# Patient Record
Sex: FEMALE | Race: WHITE | NOT HISPANIC OR LATINO | URBAN - METROPOLITAN AREA
[De-identification: names, ages, dates, MRNs, and addresses within clinical notes are randomized per-mention and may not be internally consistent; named-entity substitution may affect disease eponyms.]

---

## 2023-07-09 ENCOUNTER — INPATIENT (INPATIENT)
Facility: HOSPITAL | Age: 23
LOS: 0 days | Discharge: HOME | End: 2023-07-10
Attending: OBSTETRICS & GYNECOLOGY | Admitting: OBSTETRICS & GYNECOLOGY
Payer: COMMERCIAL

## 2023-07-09 VITALS
HEIGHT: 60 IN | DIASTOLIC BLOOD PRESSURE: 67 MMHG | TEMPERATURE: 99 F | HEART RATE: 89 BPM | RESPIRATION RATE: 16 BRPM | WEIGHT: 110.01 LBS | SYSTOLIC BLOOD PRESSURE: 119 MMHG

## 2023-07-09 DIAGNOSIS — O26.893 OTHER SPECIFIED PREGNANCY RELATED CONDITIONS, THIRD TRIMESTER: ICD-10-CM

## 2023-07-09 LAB
ABO RH CONFIRMATION: SIGNIFICANT CHANGE UP
APPEARANCE UR: CLEAR — SIGNIFICANT CHANGE UP
BASOPHILS # BLD AUTO: 0.04 K/UL — SIGNIFICANT CHANGE UP (ref 0–0.2)
BASOPHILS # BLD AUTO: 0.06 K/UL — SIGNIFICANT CHANGE UP (ref 0–0.2)
BASOPHILS NFR BLD AUTO: 0.4 % — SIGNIFICANT CHANGE UP (ref 0–1)
BASOPHILS NFR BLD AUTO: 0.4 % — SIGNIFICANT CHANGE UP (ref 0–1)
BILIRUB UR-MCNC: NEGATIVE — SIGNIFICANT CHANGE UP
COLOR SPEC: SIGNIFICANT CHANGE UP
DIFF PNL FLD: NEGATIVE — SIGNIFICANT CHANGE UP
EOSINOPHIL # BLD AUTO: 0.04 K/UL — SIGNIFICANT CHANGE UP (ref 0–0.7)
EOSINOPHIL # BLD AUTO: 0.11 K/UL — SIGNIFICANT CHANGE UP (ref 0–0.7)
EOSINOPHIL NFR BLD AUTO: 0.4 % — SIGNIFICANT CHANGE UP (ref 0–8)
EOSINOPHIL NFR BLD AUTO: 0.8 % — SIGNIFICANT CHANGE UP (ref 0–8)
GLUCOSE UR QL: NEGATIVE — SIGNIFICANT CHANGE UP
HCT VFR BLD CALC: 32.5 % — LOW (ref 37–47)
HCT VFR BLD CALC: 34.9 % — LOW (ref 37–47)
HGB BLD-MCNC: 10.7 G/DL — LOW (ref 12–16)
HGB BLD-MCNC: 11.7 G/DL — LOW (ref 12–16)
HIV 1 & 2 AB SERPL IA.RAPID: SIGNIFICANT CHANGE UP
IMM GRANULOCYTES NFR BLD AUTO: 1.3 % — HIGH (ref 0.1–0.3)
IMM GRANULOCYTES NFR BLD AUTO: 1.5 % — HIGH (ref 0.1–0.3)
KETONES UR-MCNC: ABNORMAL
LEUKOCYTE ESTERASE UR-ACNC: NEGATIVE — SIGNIFICANT CHANGE UP
LYMPHOCYTES # BLD AUTO: 16.8 % — LOW (ref 20.5–51.1)
LYMPHOCYTES # BLD AUTO: 2.32 K/UL — SIGNIFICANT CHANGE UP (ref 1.2–3.4)
LYMPHOCYTES # BLD AUTO: 2.55 K/UL — SIGNIFICANT CHANGE UP (ref 1.2–3.4)
LYMPHOCYTES # BLD AUTO: 23.7 % — SIGNIFICANT CHANGE UP (ref 20.5–51.1)
MCHC RBC-ENTMCNC: 27.2 PG — SIGNIFICANT CHANGE UP (ref 27–31)
MCHC RBC-ENTMCNC: 27.5 PG — SIGNIFICANT CHANGE UP (ref 27–31)
MCHC RBC-ENTMCNC: 32.9 G/DL — SIGNIFICANT CHANGE UP (ref 32–37)
MCHC RBC-ENTMCNC: 33.5 G/DL — SIGNIFICANT CHANGE UP (ref 32–37)
MCV RBC AUTO: 81.9 FL — SIGNIFICANT CHANGE UP (ref 81–99)
MCV RBC AUTO: 82.7 FL — SIGNIFICANT CHANGE UP (ref 81–99)
MONOCYTES # BLD AUTO: 0.74 K/UL — HIGH (ref 0.1–0.6)
MONOCYTES # BLD AUTO: 0.94 K/UL — HIGH (ref 0.1–0.6)
MONOCYTES NFR BLD AUTO: 6.8 % — SIGNIFICANT CHANGE UP (ref 1.7–9.3)
MONOCYTES NFR BLD AUTO: 6.9 % — SIGNIFICANT CHANGE UP (ref 1.7–9.3)
NEUTROPHILS # BLD AUTO: 10.24 K/UL — HIGH (ref 1.4–6.5)
NEUTROPHILS # BLD AUTO: 7.23 K/UL — HIGH (ref 1.4–6.5)
NEUTROPHILS NFR BLD AUTO: 67.1 % — SIGNIFICANT CHANGE UP (ref 42.2–75.2)
NEUTROPHILS NFR BLD AUTO: 73.9 % — SIGNIFICANT CHANGE UP (ref 42.2–75.2)
NITRITE UR-MCNC: NEGATIVE — SIGNIFICANT CHANGE UP
NRBC # BLD: 0 /100 WBCS — SIGNIFICANT CHANGE UP (ref 0–0)
NRBC # BLD: 0 /100 WBCS — SIGNIFICANT CHANGE UP (ref 0–0)
PH UR: 6.5 — SIGNIFICANT CHANGE UP (ref 5–8)
PLATELET # BLD AUTO: 228 K/UL — SIGNIFICANT CHANGE UP (ref 130–400)
PLATELET # BLD AUTO: 245 K/UL — SIGNIFICANT CHANGE UP (ref 130–400)
PMV BLD: 10.6 FL — HIGH (ref 7.4–10.4)
PMV BLD: 10.9 FL — HIGH (ref 7.4–10.4)
PRENATAL SYPHILIS TEST: SIGNIFICANT CHANGE UP
PROT UR-MCNC: SIGNIFICANT CHANGE UP
RBC # BLD: 3.93 M/UL — LOW (ref 4.2–5.4)
RBC # BLD: 4.26 M/UL — SIGNIFICANT CHANGE UP (ref 4.2–5.4)
RBC # FLD: 15.9 % — HIGH (ref 11.5–14.5)
RBC # FLD: 16 % — HIGH (ref 11.5–14.5)
SP GR SPEC: 1.02 — SIGNIFICANT CHANGE UP (ref 1.01–1.03)
UROBILINOGEN FLD QL: SIGNIFICANT CHANGE UP
WBC # BLD: 10.76 K/UL — SIGNIFICANT CHANGE UP (ref 4.8–10.8)
WBC # BLD: 13.85 K/UL — HIGH (ref 4.8–10.8)
WBC # FLD AUTO: 10.76 K/UL — SIGNIFICANT CHANGE UP (ref 4.8–10.8)
WBC # FLD AUTO: 13.85 K/UL — HIGH (ref 4.8–10.8)

## 2023-07-09 PROCEDURE — 86850 RBC ANTIBODY SCREEN: CPT

## 2023-07-09 PROCEDURE — 85025 COMPLETE CBC W/AUTO DIFF WBC: CPT

## 2023-07-09 PROCEDURE — 86901 BLOOD TYPING SEROLOGIC RH(D): CPT

## 2023-07-09 PROCEDURE — 80354 DRUG SCREENING FENTANYL: CPT

## 2023-07-09 PROCEDURE — 87340 HEPATITIS B SURFACE AG IA: CPT

## 2023-07-09 PROCEDURE — 36415 COLL VENOUS BLD VENIPUNCTURE: CPT

## 2023-07-09 PROCEDURE — 86762 RUBELLA ANTIBODY: CPT

## 2023-07-09 PROCEDURE — 80307 DRUG TEST PRSMV CHEM ANLYZR: CPT

## 2023-07-09 PROCEDURE — 86900 BLOOD TYPING SEROLOGIC ABO: CPT

## 2023-07-09 PROCEDURE — 86735 MUMPS ANTIBODY: CPT

## 2023-07-09 PROCEDURE — 86703 HIV-1/HIV-2 1 RESULT ANTBDY: CPT

## 2023-07-09 PROCEDURE — 59050 FETAL MONITOR W/REPORT: CPT

## 2023-07-09 PROCEDURE — 81003 URINALYSIS AUTO W/O SCOPE: CPT

## 2023-07-09 PROCEDURE — 86592 SYPHILIS TEST NON-TREP QUAL: CPT

## 2023-07-09 PROCEDURE — 86765 RUBEOLA ANTIBODY: CPT

## 2023-07-09 PROCEDURE — 86787 VARICELLA-ZOSTER ANTIBODY: CPT

## 2023-07-09 RX ORDER — FAMOTIDINE 10 MG/ML
20 INJECTION INTRAVENOUS ONCE
Refills: 0 | Status: COMPLETED | OUTPATIENT
Start: 2023-07-09 | End: 2023-07-09

## 2023-07-09 RX ORDER — KETOROLAC TROMETHAMINE 30 MG/ML
30 SYRINGE (ML) INJECTION ONCE
Refills: 0 | Status: DISCONTINUED | OUTPATIENT
Start: 2023-07-09 | End: 2023-07-09

## 2023-07-09 RX ORDER — MAGNESIUM HYDROXIDE 400 MG/1
30 TABLET, CHEWABLE ORAL
Refills: 0 | Status: DISCONTINUED | OUTPATIENT
Start: 2023-07-09 | End: 2023-07-10

## 2023-07-09 RX ORDER — OXYTOCIN 10 UNIT/ML
41.67 VIAL (ML) INJECTION
Qty: 20 | Refills: 0 | Status: DISCONTINUED | OUTPATIENT
Start: 2023-07-09 | End: 2023-07-10

## 2023-07-09 RX ORDER — LANOLIN
1 OINTMENT (GRAM) TOPICAL EVERY 6 HOURS
Refills: 0 | Status: DISCONTINUED | OUTPATIENT
Start: 2023-07-09 | End: 2023-07-10

## 2023-07-09 RX ORDER — OXYTOCIN 10 UNIT/ML
333.33 VIAL (ML) INJECTION
Qty: 20 | Refills: 0 | Status: DISCONTINUED | OUTPATIENT
Start: 2023-07-09 | End: 2023-07-09

## 2023-07-09 RX ORDER — DIBUCAINE 1 %
1 OINTMENT (GRAM) RECTAL EVERY 6 HOURS
Refills: 0 | Status: DISCONTINUED | OUTPATIENT
Start: 2023-07-09 | End: 2023-07-10

## 2023-07-09 RX ORDER — AMPICILLIN TRIHYDRATE 250 MG
1 CAPSULE ORAL EVERY 4 HOURS
Refills: 0 | Status: DISCONTINUED | OUTPATIENT
Start: 2023-07-09 | End: 2023-07-09

## 2023-07-09 RX ORDER — FENTANYL/BUPIVACAINE/NS/PF 2MCG/ML-.1
250 PLASTIC BAG, INJECTION (ML) INJECTION
Refills: 0 | Status: DISCONTINUED | OUTPATIENT
Start: 2023-07-09 | End: 2023-07-09

## 2023-07-09 RX ORDER — SIMETHICONE 80 MG/1
80 TABLET, CHEWABLE ORAL EVERY 4 HOURS
Refills: 0 | Status: DISCONTINUED | OUTPATIENT
Start: 2023-07-09 | End: 2023-07-10

## 2023-07-09 RX ORDER — NALOXONE HYDROCHLORIDE 4 MG/.1ML
0.1 SPRAY NASAL
Refills: 0 | Status: DISCONTINUED | OUTPATIENT
Start: 2023-07-09 | End: 2023-07-09

## 2023-07-09 RX ORDER — PRAMOXINE HYDROCHLORIDE 150 MG/15G
1 AEROSOL, FOAM RECTAL EVERY 4 HOURS
Refills: 0 | Status: DISCONTINUED | OUTPATIENT
Start: 2023-07-09 | End: 2023-07-10

## 2023-07-09 RX ORDER — IBUPROFEN 200 MG
600 TABLET ORAL EVERY 6 HOURS
Refills: 0 | Status: DISCONTINUED | OUTPATIENT
Start: 2023-07-09 | End: 2023-07-10

## 2023-07-09 RX ORDER — ONDANSETRON 8 MG/1
4 TABLET, FILM COATED ORAL EVERY 6 HOURS
Refills: 0 | Status: DISCONTINUED | OUTPATIENT
Start: 2023-07-09 | End: 2023-07-09

## 2023-07-09 RX ORDER — AER TRAVELER 0.5 G/1
1 SOLUTION RECTAL; TOPICAL EVERY 4 HOURS
Refills: 0 | Status: DISCONTINUED | OUTPATIENT
Start: 2023-07-09 | End: 2023-07-10

## 2023-07-09 RX ORDER — TETANUS TOXOID, REDUCED DIPHTHERIA TOXOID AND ACELLULAR PERTUSSIS VACCINE, ADSORBED 5; 2.5; 8; 8; 2.5 [IU]/.5ML; [IU]/.5ML; UG/.5ML; UG/.5ML; UG/.5ML
0.5 SUSPENSION INTRAMUSCULAR ONCE
Refills: 0 | Status: DISCONTINUED | OUTPATIENT
Start: 2023-07-09 | End: 2023-07-10

## 2023-07-09 RX ORDER — DIPHENHYDRAMINE HCL 50 MG
25 CAPSULE ORAL EVERY 6 HOURS
Refills: 0 | Status: DISCONTINUED | OUTPATIENT
Start: 2023-07-09 | End: 2023-07-10

## 2023-07-09 RX ORDER — HYDROCORTISONE 1 %
1 OINTMENT (GRAM) TOPICAL EVERY 6 HOURS
Refills: 0 | Status: DISCONTINUED | OUTPATIENT
Start: 2023-07-09 | End: 2023-07-10

## 2023-07-09 RX ORDER — OXYCODONE HYDROCHLORIDE 5 MG/1
5 TABLET ORAL
Refills: 0 | Status: DISCONTINUED | OUTPATIENT
Start: 2023-07-09 | End: 2023-07-10

## 2023-07-09 RX ORDER — CITRIC ACID/SODIUM CITRATE 300-500 MG
15 SOLUTION, ORAL ORAL EVERY 6 HOURS
Refills: 0 | Status: DISCONTINUED | OUTPATIENT
Start: 2023-07-09 | End: 2023-07-09

## 2023-07-09 RX ORDER — SODIUM CHLORIDE 9 MG/ML
3 INJECTION INTRAMUSCULAR; INTRAVENOUS; SUBCUTANEOUS EVERY 8 HOURS
Refills: 0 | Status: DISCONTINUED | OUTPATIENT
Start: 2023-07-09 | End: 2023-07-10

## 2023-07-09 RX ORDER — SODIUM CHLORIDE 9 MG/ML
1000 INJECTION, SOLUTION INTRAVENOUS
Refills: 0 | Status: DISCONTINUED | OUTPATIENT
Start: 2023-07-09 | End: 2023-07-09

## 2023-07-09 RX ORDER — IBUPROFEN 200 MG
600 TABLET ORAL EVERY 6 HOURS
Refills: 0 | Status: COMPLETED | OUTPATIENT
Start: 2023-07-09 | End: 2024-06-06

## 2023-07-09 RX ORDER — AMPICILLIN TRIHYDRATE 250 MG
2 CAPSULE ORAL ONCE
Refills: 0 | Status: COMPLETED | OUTPATIENT
Start: 2023-07-09 | End: 2023-07-09

## 2023-07-09 RX ORDER — OXYCODONE HYDROCHLORIDE 5 MG/1
5 TABLET ORAL ONCE
Refills: 0 | Status: DISCONTINUED | OUTPATIENT
Start: 2023-07-09 | End: 2023-07-10

## 2023-07-09 RX ORDER — BENZOCAINE 10 %
1 GEL (GRAM) MUCOUS MEMBRANE EVERY 6 HOURS
Refills: 0 | Status: DISCONTINUED | OUTPATIENT
Start: 2023-07-09 | End: 2023-07-10

## 2023-07-09 RX ORDER — ACETAMINOPHEN 500 MG
975 TABLET ORAL
Refills: 0 | Status: DISCONTINUED | OUTPATIENT
Start: 2023-07-09 | End: 2023-07-10

## 2023-07-09 RX ORDER — DEXAMETHASONE 0.5 MG/5ML
4 ELIXIR ORAL EVERY 6 HOURS
Refills: 0 | Status: DISCONTINUED | OUTPATIENT
Start: 2023-07-09 | End: 2023-07-09

## 2023-07-09 RX ADMIN — Medication 975 MILLIGRAM(S): at 22:01

## 2023-07-09 RX ADMIN — Medication 1 SPRAY(S): at 21:00

## 2023-07-09 RX ADMIN — Medication 600 MILLIGRAM(S): at 13:30

## 2023-07-09 RX ADMIN — MAGNESIUM HYDROXIDE 30 MILLILITER(S): 400 TABLET, CHEWABLE ORAL at 22:44

## 2023-07-09 RX ADMIN — Medication 975 MILLIGRAM(S): at 21:02

## 2023-07-09 RX ADMIN — FAMOTIDINE 20 MILLIGRAM(S): 10 INJECTION INTRAVENOUS at 05:05

## 2023-07-09 RX ADMIN — Medication 108 GRAM(S): at 06:56

## 2023-07-09 RX ADMIN — Medication 600 MILLIGRAM(S): at 23:43

## 2023-07-09 RX ADMIN — Medication 600 MILLIGRAM(S): at 14:15

## 2023-07-09 RX ADMIN — Medication 600 MILLIGRAM(S): at 13:23

## 2023-07-09 RX ADMIN — SODIUM CHLORIDE 3 MILLILITER(S): 9 INJECTION INTRAMUSCULAR; INTRAVENOUS; SUBCUTANEOUS at 21:04

## 2023-07-09 RX ADMIN — Medication 125 MILLIUNIT(S)/MIN: at 11:12

## 2023-07-09 RX ADMIN — SODIUM CHLORIDE 3 MILLILITER(S): 9 INJECTION INTRAMUSCULAR; INTRAVENOUS; SUBCUTANEOUS at 14:05

## 2023-07-09 RX ADMIN — Medication 600 MILLIGRAM(S): at 15:02

## 2023-07-09 RX ADMIN — Medication 1 TABLET(S): at 14:01

## 2023-07-09 RX ADMIN — Medication 200 GRAM(S): at 03:03

## 2023-07-09 NOTE — OB PROVIDER DELIVERY SUMMARY - NSDELIVERYTYPEA_OBGYN_ALL_OB
Pt needing to HYDROcodone-acetaminophen (NORCO) 7.5-325 MG per tablet [[id number]] (Order [id number]) refill    
Vaginal Delivery

## 2023-07-09 NOTE — PROGRESS NOTE ADULT - ASSESSMENT
A/P: 22yo  at 40w0d GA, GBS pos per patient, h/o PPH requiring pRBC in prior pregnancy, TOLAC.  -Continue current management   -Pain management prn  -Continuous EFM/toco  -F/u pending UDS, MMR IgG, HepBSAg  -diet: CLD  -IVF hydration     Dr. Patel at bedside and Dr. Arredondo to be aware

## 2023-07-09 NOTE — OB PROVIDER H&P - ATTENDING COMMENTS
Patient admitted for ROM for labor. Patient w/ h/o prior c/s in G1 followed by  now desires TOLAC. Patient following w/ OBGYN in [place] 45 minute aware and was visiting family in [place] when she felt LOF and decided to present to [facility] instead of with OBGYN in [place]. Patient with no prenatal records but reports in GBS positive. Will attempt to obtain PNC from previous provider. Discussed w/ patient risks of TOLAC vs repeat c/s including increased risks of maternal and  morbidity/mortality with failed TOLAC or uterine rupture. Patient expressed understanding and desires TOLAC. All questions answered. H/o post partum hemorrhage in both previous pregnancies, will cross for blood.

## 2023-07-09 NOTE — OB PROVIDER DELIVERY SUMMARY - NSPROVIDERDELIVERYNOTE_OBGYN_ALL_OB_FT
Patient was fully dilated and pushing. Fetal head was OA and restituted to ROT. The anterior and posterior shoulders delivered, followed by the remaining body atraumatically. Delayed cord clamping was performed, and then clamped and cut. Cord blood gases collected x2. The  was handed to the mother and RN. The placenta delivered intact with membranes. Pitocin was administered. Uterus massaged, fundus found to be firm. Cervix, vagina and perineum inspected 2nd degree laceration was noted, repaired using 2.0 chromic gut in the usual fashion with good hemostasis.     Viable male/female infant delivered, weighing 2670, with APGAR 9 @ 1min    Laceration: 2nd degree  EBL 150cc     present for the delivery

## 2023-07-09 NOTE — OB RN DELIVERY SUMMARY - NSSELHIDDEN_OBGYN_ALL_OB_FT
[NS_DeliveryAttending1_OBGYN_ALL_OB_FT:[email or web address]=],[NS_DeliveryRN_OBGYN_ALL_OB_FT:[id number]=],[NS_CirculateRN2_OBGYN_ALL_OB_FT:[id number]=]

## 2023-07-09 NOTE — OB PROVIDER H&P - NSHPPHYSICALEXAM_GEN_ALL_CORE
Vital Signs Last 24 Hrs  T(C): 37 (09 Jul 2023 02:40), Max: 37 (09 Jul 2023 02:40)  T(F): 98.6 (09 Jul 2023 02:40), Max: 98.6 (09 Jul 2023 02:40)  HR: 77 (09 Jul 2023 03:49) (77 - 89)  BP: 120/71 (09 Jul 2023 03:49) (119/67 - 120/71)  RR: 16 (09 Jul 2023 02:40) (16 - 16)    EFM: 135/moderate/pos accels  [place]: q5min  Sono: cephalic  SVE: 7/70/-2, cephalic, grossly ruptured Vital Signs Last 24 Hrs  T(C): 37 (09 Jul 2023 02:40), Max: 37 (09 Jul 2023 02:40)  T(F): 98.6 (09 Jul 2023 02:40), Max: 98.6 (09 Jul 2023 02:40)  HR: 77 (09 Jul 2023 03:49) (77 - 89)  BP: 120/71 (09 Jul 2023 03:49) (119/67 - 120/71)  RR: 16 (09 Jul 2023 02:40) (16 - 16)    EFM: 135/moderate/pos accels  [place]: q5min  Sono: cephalic, fundal placenta, EFW 6.14lb, MVP 2.16cm  SVE: 7/70/-2, cephalic, grossly ruptured Vital Signs Last 24 Hrs  T(C): 37 (09 Jul 2023 02:40), Max: 37 (09 Jul 2023 02:40)  T(F): 98.6 (09 Jul 2023 02:40), Max: 98.6 (09 Jul 2023 02:40)  HR: 77 (09 Jul 2023 03:49) (77 - 89)  BP: 120/71 (09 Jul 2023 03:49) (119/67 - 120/71)  RR: 16 (09 Jul 2023 02:40) (16 - 16)    EFM: 135/moderate/pos accels  [place]: q5min  Sono: cephalic, fundal placenta, EFW 6.14lb, MVP 2.16cm  SVE: 3/70/-2, cephalic, grossly ruptured

## 2023-07-09 NOTE — OB PROVIDER H&P - NS_OBGYNHISTORY_OBGYN_ALL_OB_FT
OB  19: c/s for arrest of labor, 7lb, complicated by PPH requiring 2 units   21: successful , 6lb, complicated by PPH requiring 2 units     GYN  Denies fibroids, cysts, abnormal paps, STIs.

## 2023-07-09 NOTE — OB RN PATIENT PROFILE - FALL HARM RISK - UNIVERSAL INTERVENTIONS
Bed in lowest position, wheels locked, appropriate side rails in place/Call bell, personal items and telephone in reach/Instruct patient to call for assistance before getting out of bed or chair/Non-slip footwear when patient is out of bed/[place] to call system/Physically safe environment - no spills, clutter or unnecessary equipment/Purposeful Proactive Rounding/Room/bathroom lighting operational, light cord in reach

## 2023-07-09 NOTE — OB PROVIDER H&P - HISTORY OF PRESENT ILLNESS
22yo  at 40w0d, JOY 23 by LMP c/w early ultrasound, h/o c/s for arrested labor, GBS positive, presented to triage for suspected rupture of membranes. Patient reports continuous leakage of clear fluid since 11pm, followed by irregular contractions. Pregnancy has been uncomplicated.

## 2023-07-09 NOTE — OB PROVIDER H&P - ASSESSMENT
22yo  at 40w0d, GBS positive per patient, h/o c/s for arrest of labor plus one , grossly ruptured in early labor, desires TOLAC.    -Admit to L&D  -Admission labs  -Prenatal labs  -Monitor vitals  -Cont EFM/Peggs  -Pain management prn  -Clear liquid diet  -Ampicillin for GBS  -discussed risks of uterine rupture (with an unknown scar) including: maternal hemorrhage, fetal hemorrhage, maternal death, and fetal death associated with TOLAC versus repeat   -pt demonstrated understanding risks/alternatives/benefits and refused repeat  and desires TOLAC for     Dr. Velázquez and Dr. Leonard aware.     22yo  at 40w0d, GBS positive per patient, h/o c/s for arrest of labor followed by a , grossly ruptured in early labor, desires TOLAC.    -Admit to L&D  -Admission labs  -Prenatal labs  -Monitor vitals  -Cont EFM/[place]  -Pain management prn  -Clear liquid diet  -Ampicillin for GBS  -discussed risks of uterine rupture (with an unknown scar) including: maternal hemorrhage, fetal hemorrhage, maternal death, and fetal death associated with TOLAC versus repeat   -pt demonstrated understanding risks/alternatives/benefits and refused repeat  and desires TOLAC for     Dr. Velázquez and Dr. Leonard aware.

## 2023-07-09 NOTE — OB PROVIDER H&P - NSLOWPPHRISK_OBGYN_A_OB
[place] Pregnancy/Less than or equal to 4 previous vaginal births/No known bleeding disorder/No other PPH risks indicated

## 2023-07-09 NOTE — PROGRESS NOTE ADULT - ASSESSMENT
A/P: 22yo  at 40w0d GA, GBS pos per patient, h/o PPH requiring pRBC in prior pregnancy, TOLAC.    -Continue current management   -Pain management prn  -Continuous EFM/toco  -F/u pending UDS, MMR IgG, HepBSAg  -diet: CLD  -IVF hydration     Dr. Patel and Dr. Arredondo aware

## 2023-07-09 NOTE — OB PROVIDER DELIVERY SUMMARY - NSSELHIDDEN_OBGYN_ALL_OB_FT
[NS_DeliveryAttending1_OBGYN_ALL_OB_FT:[email or web address]=],[NS_DeliveryRN_OBGYN_ALL_OB_FT:[id number]=],[NS_CirculateRN2_OBGYN_ALL_OB_FT:[id number]=]

## 2023-07-09 NOTE — OB RN DELIVERY SUMMARY - NS_DELIVERYRN_OBGYN_ALL_OB_FT
Advised Patient that appointment tomorrow is for EMB to follow up on abnormal EMB and provera treatment. Patient verbalized understanding and has no further questions at this time.     Tova Conrad - RICHARD

## 2023-07-10 VITALS
HEART RATE: 83 BPM | TEMPERATURE: 98 F | RESPIRATION RATE: 18 BRPM | DIASTOLIC BLOOD PRESSURE: 59 MMHG | SYSTOLIC BLOOD PRESSURE: 114 MMHG

## 2023-07-10 LAB
HBV SURFACE AG SERPL QL IA: SIGNIFICANT CHANGE UP
L&D DRUG SCREEN, URINE: SIGNIFICANT CHANGE UP
MEV IGG SER-ACNC: 79.7 AU/ML — SIGNIFICANT CHANGE UP
MEV IGG+IGM SER-IMP: POSITIVE — SIGNIFICANT CHANGE UP
MUV AB SER-ACNC: NEGATIVE — SIGNIFICANT CHANGE UP
MUV IGG FLD-ACNC: <5 AU/ML — SIGNIFICANT CHANGE UP
RUBV IGG SER-ACNC: 1.6 INDEX — SIGNIFICANT CHANGE UP
RUBV IGG SER-IMP: POSITIVE — SIGNIFICANT CHANGE UP
VZV IGG FLD QL IA: <10 INDEX — SIGNIFICANT CHANGE UP
VZV IGG FLD QL IA: NEGATIVE — SIGNIFICANT CHANGE UP

## 2023-07-10 PROCEDURE — 99238 HOSP IP/OBS DSCHRG MGMT 30/<: CPT

## 2023-07-10 RX ORDER — SIMETHICONE 80 MG/1
1 TABLET, CHEWABLE ORAL
Qty: 0 | Refills: 0 | DISCHARGE
Start: 2023-07-10

## 2023-07-10 RX ORDER — MAGNESIUM HYDROXIDE 400 MG/1
30 TABLET, CHEWABLE ORAL
Qty: 0 | Refills: 0 | DISCHARGE
Start: 2023-07-10

## 2023-07-10 RX ORDER — IBUPROFEN 200 MG
1 TABLET ORAL
Qty: 0 | Refills: 0 | DISCHARGE
Start: 2023-07-10

## 2023-07-10 RX ORDER — ACETAMINOPHEN 500 MG
3 TABLET ORAL
Qty: 0 | Refills: 0 | DISCHARGE
Start: 2023-07-10

## 2023-07-10 RX ADMIN — Medication 975 MILLIGRAM(S): at 15:40

## 2023-07-10 RX ADMIN — SODIUM CHLORIDE 3 MILLILITER(S): 9 INJECTION INTRAMUSCULAR; INTRAVENOUS; SUBCUTANEOUS at 13:19

## 2023-07-10 RX ADMIN — Medication 600 MILLIGRAM(S): at 06:51

## 2023-07-10 RX ADMIN — Medication 600 MILLIGRAM(S): at 00:30

## 2023-07-10 RX ADMIN — SODIUM CHLORIDE 3 MILLILITER(S): 9 INJECTION INTRAMUSCULAR; INTRAVENOUS; SUBCUTANEOUS at 05:15

## 2023-07-10 RX ADMIN — Medication 975 MILLIGRAM(S): at 02:42

## 2023-07-10 RX ADMIN — Medication 975 MILLIGRAM(S): at 15:10

## 2023-07-10 RX ADMIN — Medication 600 MILLIGRAM(S): at 05:10

## 2023-07-10 RX ADMIN — Medication 975 MILLIGRAM(S): at 03:33

## 2023-07-10 NOTE — DISCHARGE NOTE OB - CARE PLAN
1 Principal Discharge DX:	 (vaginal birth after )  Assessment and plan of treatment:	No heavy lifting.   Nothing inside the vagina for 6 weeks: no tampons, douching, sexual intercourse, tub baths or pools.   If you have a fever over 100.4F, severe abdominal pain or heavy vaginal bleeding please call your doctor or go to the emergency room.    Please follow up with your OBGYN in 6 weeks for a postpartum visit.

## 2023-07-10 NOTE — PROGRESS NOTE ADULT - ASSESSMENT
A/P: 22 yo now P3 S/P  PPD1, recovering well   - pain management with PO pain meds   - monitor vitals/bleeding   - f/u HbsAb, MMR IgG  - encourage PO hydration, ambulation  - regular diet as tolerated   - routine postpartum care   - anticipate d/c home today    Will inform Dr. Grimes and OBMARIANNA attending A/P: 24 yo now P3 S/P  PPD1, recovering well   - pain management with PO pain meds   - monitor vitals/bleeding   - f/u HbsAb, MMR IgG  - encourage PO hydration, ambulation  - regular diet as tolerated   - routine postpartum care   - anticipate d/c home today    Dr. Grimes aware and OBGYN attending to be made aware

## 2023-07-10 NOTE — DISCHARGE NOTE OB - NS MD DC FALL RISK RISK
For information on Fall & Injury Prevention, visit: [email or web address] OR  [email or web address] OR  https://www.cdc.gov/steadi/patient.html

## 2023-07-10 NOTE — DISCHARGE NOTE OB - PATIENT PORTAL LINK FT
You can access the FollowMyHealth Patient Portal offered by [facility] by registering at the following website: [email or web address]. By joining [facility]’s FollowMyHealth portal, you will also be able to view your health information using other applications (apps) compatible with our system.

## 2023-07-10 NOTE — DISCHARGE NOTE OB - PLAN OF CARE
No heavy lifting.   Nothing inside the vagina for 6 weeks: no tampons, douching, sexual intercourse, tub baths or pools.   If you have a fever over 100.4F, severe abdominal pain or heavy vaginal bleeding please call your doctor or go to the emergency room.    Please follow up with your OBGYN in 6 weeks for a postpartum visit.

## 2023-07-10 NOTE — DISCHARGE NOTE OB - CARE PROVIDER_API CALL
Caleb Arredondo  Obstetrics and Gynecology  [address] [place] [id number]  Phone: [phone]  Fax: [phone]  Follow Up Time:

## 2023-07-10 NOTE — PROGRESS NOTE ADULT - ATTENDING COMMENTS
6/6 H/H 10/29; transfused pRBC  6/11 H/H 12.9/36.7, stable - MVI w/Fe resumed  6/15 H/H 11.9/34.4, retic 2.2    Plan: Continue MVI w/Fe. Follow H/H and retic on 6/22..     postpartum  doing well  no complaints  discharge instructions

## 2023-07-10 NOTE — PROGRESS NOTE ADULT - SUBJECTIVE AND OBJECTIVE BOX
PGY2 Note    Patient seen at bedside. No complaints at the moment.    Vital Signs Last 24 Hrs  T(C): 37 (2023 02:40), Max: 37 (2023 02:40)  T(F): 98.6 (2023 02:40), Max: 98.6 (2023 02:40)  HR: 62 (2023 07:09) (61 - 92)  BP: 121/73 (2023 07:09) (100/55 - 121/73)  RR: 16 (2023 02:40) (16 - 16)    EFM: 130/mod/+accels  TOCO: q5min  SVE: 5/70/-1    Medications:  amp: first dose 0303    Labs:                        11.7   10.76 )-----------( 245      ( 2023 03:03 )             34.9       Prenatal Syphilis Test: Nonreact ( @ 03:03)  Rapid HIV-1/2 Antibody: Nonreact ( @ 03:03)  Antibody Screen: NEG (23 @ 03:03)    Urinalysis Basic - ( 2023 04:26 )  Color: Light Yellow / Appearance: Clear / S.025 / pH: x  Gluc: x / Ketone: Moderate  / Bili: Negative / Urobili: <2 mg/dL   Blood: x / Protein: Trace / Nitrite: Negative   Leuk Esterase: Negative / RBC: x / WBC x   Sq Epi: x / Non Sq Epi: x / Bacteria: x            
PGY2 Note    At bedside, patient endorses feeling increased pelvic pressure. Pain is well controlled.     Vital Signs Last 24 Hrs  T(C): 36.7 (23 @ 08:37), Max: 37 (23 @ 02:40)  T(F): 98.06 (23 @ 08:37), Max: 98.6 (23 @ 02:40)  HR: 94 (23 @ 08:54) (50 - 109)  BP: 107/63 (23 @ 08:34) (98/51 - 121/73)  RR: 16 (23 @ 07:32) (16 - 16)  SpO2: 94% (23 @ 08:54) (77% - 100%)      EFM: 140/mod/+accels/ isolated variable decel  TOCO: q5min  SVE: 9.5 (anter lip)/90/0  vertex, ruptured    Medications:  amp: first dose 0303    Labs:                        11.7   10.76 )-----------( 245      ( 2023 03:03 )             34.9       Prenatal Syphilis Test: Nonreact ( @ 03:03)  Rapid HIV-1/2 Antibody: Nonreact ( @ 03:03)  Antibody Screen: NEG (23 @ 03:03)    Urinalysis Basic - ( 2023 04:26 )  Color: Light Yellow / Appearance: Clear / S.025 / pH: x  Gluc: x / Ketone: Moderate  / Bili: Negative / Urobili: <2 mg/dL   Blood: x / Protein: Trace / Nitrite: Negative   Leuk Esterase: Negative / RBC: x / WBC x   Sq Epi: x / Non Sq Epi: x / Bacteria: x            
PGY 1 Note:    Pt seen and evaluated at bedside. Pain well controlled. Vaginal bleeding minimal. Denies fever, chills, dizziness/lightheadedness, nausea/vomiting, diarrhea, dysuria, LE pain.     Ambulating: Yes  Voiding: Yes  Breast or bottle feeding: Breast  Diet: tolerating PO    Physical Exam  Vital Signs Last 24 Hrs  T(C): 36.7 (09 Jul 2023 23:10), Max: 37 (09 Jul 2023 07:32)  T(F): 98 (09 Jul 2023 23:10), Max: 98.1 (09 Jul 2023 11:50)  HR: 81 (09 Jul 2023 23:10) (50 - 149)  BP: 97/54 (09 Jul 2023 23:10) (85/55 - 121/73)  RR: 18 (09 Jul 2023 23:10) (16 - 18)  SpO2: 99% (09 Jul 2023 10:19) (77% - 100%)      Gen: AAOx3, NAD  Fundus: firm, below umbilicus   Abd: Soft, nontender, nondistended  Lochia: minimal   Ext: No calf tenderness, no swelling    Labs:                        10.7   13.85 )-----------( 228      ( 09 Jul 2023 22:10 )             32.5                         11.7   10.76 )-----------( 245      ( 09 Jul 2023 03:03 )             34.9        MEDICATIONS  (STANDING):  acetaminophen     Tablet .. 975 milliGRAM(s) Oral <User Schedule>  diphtheria/tetanus/pertussis (acellular) Vaccine (Adacel) 0.5 milliLiter(s) IntraMuscular once  ibuprofen  Tablet. 600 milliGRAM(s) Oral every 6 hours  oxytocin Infusion 41.667 milliUNIT(s)/Min (125 mL/Hr) IV Continuous <Continuous>  prenatal multivitamin 1 Tablet(s) Oral daily  sodium chloride 0.9% lock flush 3 milliLiter(s) IV Push every 8 hours    MEDICATIONS  (PRN):  benzocaine 20%/menthol 0.5% Spray 1 Spray(s) Topical every 6 hours PRN for Perineal discomfort  dibucaine 1% Ointment 1 Application(s) Topical every 6 hours PRN Perineal discomfort  diphenhydrAMINE 25 milliGRAM(s) Oral every 6 hours PRN Pruritus  hydrocortisone 1% Cream 1 Application(s) Topical every 6 hours PRN Moderate Pain (4-6)  lanolin Ointment 1 Application(s) Topical every 6 hours PRN nipple soreness  magnesium hydroxide Suspension 30 milliLiter(s) Oral two times a day PRN Constipation  oxyCODONE    IR 5 milliGRAM(s) Oral every 3 hours PRN Moderate to Severe Pain (4-10)  oxyCODONE    IR 5 milliGRAM(s) Oral once PRN Moderate to Severe Pain (4-10)  pramoxine 1%/zinc 5% Cream 1 Application(s) Topical every 4 hours PRN Moderate Pain (4-6)  simethicone 80 milliGRAM(s) Chew every 4 hours PRN Gas  witch hazel Pads 1 Application(s) Topical every 4 hours PRN Perineal discomfort

## 2023-07-10 NOTE — DISCHARGE NOTE OB - HOSPITAL COURSE
24yo now  S/p  followed by an uncomplicated postpartum course. Patient met all postpartum milestones and vitals stable for discharge. GBS pos this pregnancy per patient, received GBS ppx during labor course.     EBL: 150cc  H/H: 10.7/32.5

## 2023-07-14 DIAGNOSIS — O34.211 MATERNAL CARE FOR LOW TRANSVERSE SCAR FROM PREVIOUS CESAREAN DELIVERY: ICD-10-CM

## 2023-07-14 DIAGNOSIS — Z28.09 IMMUNIZATION NOT CARRIED OUT BECAUSE OF OTHER CONTRAINDICATION: ICD-10-CM

## 2023-07-14 DIAGNOSIS — Z3A.40 40 WEEKS GESTATION OF PREGNANCY: ICD-10-CM

## 2023-07-14 DIAGNOSIS — O48.0 POST-TERM PREGNANCY: ICD-10-CM

## 2023-07-14 DIAGNOSIS — Z34.83 ENCOUNTER FOR SUPERVISION OF OTHER NORMAL PREGNANCY, THIRD TRIMESTER: ICD-10-CM
